# Patient Record
Sex: MALE | Race: WHITE | Employment: UNEMPLOYED | URBAN - METROPOLITAN AREA
[De-identification: names, ages, dates, MRNs, and addresses within clinical notes are randomized per-mention and may not be internally consistent; named-entity substitution may affect disease eponyms.]

---

## 2020-06-18 ENCOUNTER — HOSPITAL ENCOUNTER (EMERGENCY)
Age: 50
Discharge: HOME | End: 2020-06-19
Attending: EMERGENCY MEDICINE
Payer: COMMERCIAL

## 2020-06-18 ENCOUNTER — APPOINTMENT (OUTPATIENT)
Dept: GENERAL RADIOLOGY | Age: 50
End: 2020-06-18
Payer: COMMERCIAL

## 2020-06-18 PROCEDURE — G0480 DRUG TEST DEF 1-7 CLASSES: HCPCS

## 2020-06-18 PROCEDURE — 93005 ELECTROCARDIOGRAM TRACING: CPT | Performed by: EMERGENCY MEDICINE

## 2020-06-18 PROCEDURE — 85025 COMPLETE CBC W/AUTO DIFF WBC: CPT

## 2020-06-18 PROCEDURE — 83605 ASSAY OF LACTIC ACID: CPT

## 2020-06-18 PROCEDURE — 71045 X-RAY EXAM CHEST 1 VIEW: CPT

## 2020-06-18 PROCEDURE — 84484 ASSAY OF TROPONIN QUANT: CPT

## 2020-06-18 PROCEDURE — 82140 ASSAY OF AMMONIA: CPT

## 2020-06-18 PROCEDURE — 99285 EMERGENCY DEPT VISIT HI MDM: CPT

## 2020-06-18 PROCEDURE — 80307 DRUG TEST PRSMV CHEM ANLYZR: CPT

## 2020-06-18 PROCEDURE — 80048 BASIC METABOLIC PNL TOTAL CA: CPT

## 2020-06-18 PROCEDURE — 80076 HEPATIC FUNCTION PANEL: CPT

## 2020-06-18 RX ORDER — 0.9 % SODIUM CHLORIDE 0.9 %
1000 INTRAVENOUS SOLUTION INTRAVENOUS ONCE
Status: COMPLETED | OUTPATIENT
Start: 2020-06-18 | End: 2020-06-19

## 2020-06-19 ENCOUNTER — APPOINTMENT (OUTPATIENT)
Dept: GENERAL RADIOLOGY | Age: 50
End: 2020-06-19
Payer: COMMERCIAL

## 2020-06-19 ENCOUNTER — HOSPITAL ENCOUNTER (OUTPATIENT)
Age: 50
Setting detail: OBSERVATION
Discharge: HOME | End: 2020-06-20
Attending: EMERGENCY MEDICINE | Admitting: INTERNAL MEDICINE
Payer: COMMERCIAL

## 2020-06-19 ENCOUNTER — APPOINTMENT (OUTPATIENT)
Dept: CT IMAGING | Age: 50
End: 2020-06-19
Payer: COMMERCIAL

## 2020-06-19 VITALS
RESPIRATION RATE: 16 BRPM | SYSTOLIC BLOOD PRESSURE: 114 MMHG | HEART RATE: 75 BPM | TEMPERATURE: 98.1 F | DIASTOLIC BLOOD PRESSURE: 79 MMHG | OXYGEN SATURATION: 97 %

## 2020-06-19 VITALS
SYSTOLIC BLOOD PRESSURE: 137 MMHG | TEMPERATURE: 97 F | WEIGHT: 195 LBS | BODY MASS INDEX: 23.02 KG/M2 | OXYGEN SATURATION: 99 % | DIASTOLIC BLOOD PRESSURE: 86 MMHG | HEART RATE: 86 BPM | HEIGHT: 77 IN | RESPIRATION RATE: 17 BRPM

## 2020-06-19 PROBLEM — F41.9 ANXIETY: Status: ACTIVE | Noted: 2020-06-19

## 2020-06-19 PROBLEM — T50.901A DRUG OVERDOSE, ACCIDENTAL OR UNINTENTIONAL, INITIAL ENCOUNTER: Status: ACTIVE | Noted: 2020-06-19

## 2020-06-19 PROBLEM — F19.20 POLYSUBSTANCE (EXCLUDING OPIOIDS) DEPENDENCE, DAILY USE (HCC): Status: ACTIVE | Noted: 2020-06-19

## 2020-06-19 LAB
-: ABNORMAL
ABSOLUTE EOS #: 0.33 K/UL (ref 0–0.44)
ABSOLUTE EOS #: 0.5 K/UL (ref 0–0.44)
ABSOLUTE IMMATURE GRANULOCYTE: 0.04 K/UL (ref 0–0.3)
ABSOLUTE IMMATURE GRANULOCYTE: <0.03 K/UL (ref 0–0.3)
ABSOLUTE LYMPH #: 1.82 K/UL (ref 1.1–3.7)
ABSOLUTE LYMPH #: 1.92 K/UL (ref 1.1–3.7)
ABSOLUTE MONO #: 0.64 K/UL (ref 0.1–1.2)
ABSOLUTE MONO #: 0.65 K/UL (ref 0.1–1.2)
ACETAMINOPHEN LEVEL: <5 UG/ML (ref 10–30)
ACETAMINOPHEN LEVEL: <5 UG/ML (ref 10–30)
ALBUMIN SERPL-MCNC: 4.2 G/DL (ref 3.5–5.2)
ALBUMIN/GLOBULIN RATIO: 1.6 (ref 1–2.5)
ALP BLD-CCNC: 128 U/L (ref 40–129)
ALT SERPL-CCNC: 6 U/L (ref 5–41)
AMMONIA: 64 UMOL/L (ref 16–60)
AMORPHOUS: ABNORMAL
AMPHETAMINE SCREEN URINE: NEGATIVE
ANION GAP SERPL CALCULATED.3IONS-SCNC: 11 MMOL/L (ref 9–17)
ANION GAP SERPL CALCULATED.3IONS-SCNC: 14 MMOL/L (ref 9–17)
AST SERPL-CCNC: 13 U/L
BACTERIA: ABNORMAL
BARBITURATE SCREEN URINE: NEGATIVE
BASOPHILS # BLD: 1 % (ref 0–2)
BASOPHILS # BLD: 1 % (ref 0–2)
BASOPHILS ABSOLUTE: 0.05 K/UL (ref 0–0.2)
BASOPHILS ABSOLUTE: 0.1 K/UL (ref 0–0.2)
BENZODIAZEPINE SCREEN, URINE: POSITIVE
BILIRUB SERPL-MCNC: 0.32 MG/DL (ref 0.3–1.2)
BILIRUBIN DIRECT: 0.09 MG/DL
BILIRUBIN URINE: NEGATIVE
BILIRUBIN, INDIRECT: 0.23 MG/DL (ref 0–1)
BUN BLDV-MCNC: 9 MG/DL (ref 6–20)
BUN BLDV-MCNC: 9 MG/DL (ref 6–20)
BUN/CREAT BLD: ABNORMAL (ref 9–20)
BUN/CREAT BLD: NORMAL (ref 9–20)
BUPRENORPHINE URINE: ABNORMAL
CALCIUM SERPL-MCNC: 8.6 MG/DL (ref 8.6–10.4)
CALCIUM SERPL-MCNC: 8.8 MG/DL (ref 8.6–10.4)
CANNABINOID SCREEN URINE: NEGATIVE
CASTS UA: ABNORMAL /LPF (ref 0–8)
CHLORIDE BLD-SCNC: 105 MMOL/L (ref 98–107)
CHLORIDE BLD-SCNC: 105 MMOL/L (ref 98–107)
CHP ED QC CHECK: YES
CO2: 23 MMOL/L (ref 20–31)
CO2: 24 MMOL/L (ref 20–31)
COCAINE METABOLITE, URINE: NEGATIVE
COLOR: ABNORMAL
CREAT SERPL-MCNC: 0.74 MG/DL (ref 0.7–1.2)
CREAT SERPL-MCNC: 1.09 MG/DL (ref 0.7–1.2)
CRYSTALS, UA: ABNORMAL /HPF
CRYSTALS, UA: ABNORMAL /HPF
DIFFERENTIAL TYPE: ABNORMAL
DIFFERENTIAL TYPE: ABNORMAL
EKG ATRIAL RATE: 85 BPM
EKG P AXIS: 55 DEGREES
EKG P-R INTERVAL: 170 MS
EKG Q-T INTERVAL: 390 MS
EKG QRS DURATION: 100 MS
EKG QTC CALCULATION (BAZETT): 464 MS
EKG R AXIS: 37 DEGREES
EKG T AXIS: 31 DEGREES
EKG VENTRICULAR RATE: 85 BPM
EOSINOPHILS RELATIVE PERCENT: 5 % (ref 1–4)
EOSINOPHILS RELATIVE PERCENT: 6 % (ref 1–4)
EPITHELIAL CELLS UA: ABNORMAL /HPF (ref 0–5)
ETHANOL PERCENT: <0.01 %
ETHANOL PERCENT: <0.01 %
ETHANOL: <10 MG/DL
ETHANOL: <10 MG/DL
GFR AFRICAN AMERICAN: >60 ML/MIN
GFR AFRICAN AMERICAN: >60 ML/MIN
GFR NON-AFRICAN AMERICAN: >60 ML/MIN
GFR NON-AFRICAN AMERICAN: >60 ML/MIN
GFR SERPL CREATININE-BSD FRML MDRD: ABNORMAL ML/MIN/{1.73_M2}
GFR SERPL CREATININE-BSD FRML MDRD: ABNORMAL ML/MIN/{1.73_M2}
GFR SERPL CREATININE-BSD FRML MDRD: NORMAL ML/MIN/{1.73_M2}
GFR SERPL CREATININE-BSD FRML MDRD: NORMAL ML/MIN/{1.73_M2}
GLOBULIN: NORMAL G/DL (ref 1.5–3.8)
GLUCOSE BLD-MCNC: 107 MG/DL (ref 70–99)
GLUCOSE BLD-MCNC: 81 MG/DL
GLUCOSE BLD-MCNC: 81 MG/DL (ref 75–110)
GLUCOSE BLD-MCNC: 89 MG/DL (ref 70–99)
GLUCOSE URINE: NEGATIVE
HCT VFR BLD CALC: 38.8 % (ref 40.7–50.3)
HCT VFR BLD CALC: 43.5 % (ref 40.7–50.3)
HEMOGLOBIN: 12.6 G/DL (ref 13–17)
HEMOGLOBIN: 13.6 G/DL (ref 13–17)
IMMATURE GRANULOCYTES: 0 %
IMMATURE GRANULOCYTES: 1 %
KETONES, URINE: ABNORMAL
LACTIC ACID, WHOLE BLOOD: 1.3 MMOL/L (ref 0.7–2.1)
LEUKOCYTE ESTERASE, URINE: NEGATIVE
LYMPHOCYTES # BLD: 23 % (ref 24–43)
LYMPHOCYTES # BLD: 28 % (ref 24–43)
MCH RBC QN AUTO: 29.4 PG (ref 25.2–33.5)
MCH RBC QN AUTO: 29.9 PG (ref 25.2–33.5)
MCHC RBC AUTO-ENTMCNC: 31.3 G/DL (ref 28.4–34.8)
MCHC RBC AUTO-ENTMCNC: 32.5 G/DL (ref 28.4–34.8)
MCV RBC AUTO: 91.9 FL (ref 82.6–102.9)
MCV RBC AUTO: 94 FL (ref 82.6–102.9)
MDMA URINE: ABNORMAL
METHADONE SCREEN, URINE: NEGATIVE
METHAMPHETAMINE, URINE: ABNORMAL
MONOCYTES # BLD: 10 % (ref 3–12)
MONOCYTES # BLD: 8 % (ref 3–12)
MUCUS: ABNORMAL
NITRITE, URINE: NEGATIVE
NRBC AUTOMATED: 0 PER 100 WBC
NRBC AUTOMATED: 0 PER 100 WBC
OPIATES, URINE: NEGATIVE
OTHER OBSERVATIONS UA: ABNORMAL
OXYCODONE SCREEN URINE: NEGATIVE
PDW BLD-RTO: 13.4 % (ref 11.8–14.4)
PDW BLD-RTO: 13.5 % (ref 11.8–14.4)
PH UA: 5 (ref 5–8)
PHENCYCLIDINE, URINE: NEGATIVE
PLATELET # BLD: 241 K/UL (ref 138–453)
PLATELET # BLD: 259 K/UL (ref 138–453)
PLATELET ESTIMATE: ABNORMAL
PLATELET ESTIMATE: ABNORMAL
PMV BLD AUTO: 8.7 FL (ref 8.1–13.5)
PMV BLD AUTO: 8.9 FL (ref 8.1–13.5)
POTASSIUM SERPL-SCNC: 3.9 MMOL/L (ref 3.7–5.3)
POTASSIUM SERPL-SCNC: 4.1 MMOL/L (ref 3.7–5.3)
PROPOXYPHENE, URINE: ABNORMAL
PROTEIN UA: ABNORMAL
RBC # BLD: 4.22 M/UL (ref 4.21–5.77)
RBC # BLD: 4.63 M/UL (ref 4.21–5.77)
RBC # BLD: ABNORMAL 10*6/UL
RBC # BLD: ABNORMAL 10*6/UL
RBC UA: ABNORMAL /HPF (ref 0–4)
RENAL EPITHELIAL, UA: ABNORMAL /HPF
SALICYLATE LEVEL: <1 MG/DL (ref 3–10)
SALICYLATE LEVEL: <1 MG/DL (ref 3–10)
SEG NEUTROPHILS: 56 % (ref 36–65)
SEG NEUTROPHILS: 61 % (ref 36–65)
SEGMENTED NEUTROPHILS ABSOLUTE COUNT: 3.54 K/UL (ref 1.5–8.1)
SEGMENTED NEUTROPHILS ABSOLUTE COUNT: 5.31 K/UL (ref 1.5–8.1)
SODIUM BLD-SCNC: 140 MMOL/L (ref 135–144)
SODIUM BLD-SCNC: 142 MMOL/L (ref 135–144)
SPECIFIC GRAVITY UA: 1.02 (ref 1–1.03)
TEST INFORMATION: ABNORMAL
TOTAL PROTEIN: 6.9 G/DL (ref 6.4–8.3)
TOXIC TRICYCLIC SC,BLOOD: NEGATIVE
TOXIC TRICYCLIC SC,BLOOD: NEGATIVE
TRICHOMONAS: ABNORMAL
TRICYCLIC ANTIDEPRESSANTS, UR: ABNORMAL
TROPONIN INTERP: NORMAL
TROPONIN T: NORMAL NG/ML
TROPONIN, HIGH SENSITIVITY: 9 NG/L (ref 0–22)
TURBIDITY: ABNORMAL
URINE HGB: ABNORMAL
UROBILINOGEN, URINE: NORMAL
WBC # BLD: 6.4 K/UL (ref 3.5–11.3)
WBC # BLD: 8.5 K/UL (ref 3.5–11.3)
WBC # BLD: ABNORMAL 10*3/UL
WBC # BLD: ABNORMAL 10*3/UL
WBC UA: ABNORMAL /HPF (ref 0–5)
YEAST: ABNORMAL

## 2020-06-19 PROCEDURE — 81001 URINALYSIS AUTO W/SCOPE: CPT

## 2020-06-19 PROCEDURE — 93010 ELECTROCARDIOGRAM REPORT: CPT | Performed by: INTERNAL MEDICINE

## 2020-06-19 PROCEDURE — 99219 PR INITIAL OBSERVATION CARE/DAY 50 MINUTES: CPT | Performed by: NURSE PRACTITIONER

## 2020-06-19 PROCEDURE — 82947 ASSAY GLUCOSE BLOOD QUANT: CPT

## 2020-06-19 PROCEDURE — 2580000003 HC RX 258: Performed by: STUDENT IN AN ORGANIZED HEALTH CARE EDUCATION/TRAINING PROGRAM

## 2020-06-19 PROCEDURE — 99285 EMERGENCY DEPT VISIT HI MDM: CPT

## 2020-06-19 PROCEDURE — 80307 DRUG TEST PRSMV CHEM ANLYZR: CPT

## 2020-06-19 PROCEDURE — 85025 COMPLETE CBC W/AUTO DIFF WBC: CPT

## 2020-06-19 PROCEDURE — 70450 CT HEAD/BRAIN W/O DYE: CPT

## 2020-06-19 PROCEDURE — G0378 HOSPITAL OBSERVATION PER HR: HCPCS

## 2020-06-19 PROCEDURE — G0480 DRUG TEST DEF 1-7 CLASSES: HCPCS

## 2020-06-19 PROCEDURE — 93005 ELECTROCARDIOGRAM TRACING: CPT | Performed by: STUDENT IN AN ORGANIZED HEALTH CARE EDUCATION/TRAINING PROGRAM

## 2020-06-19 PROCEDURE — 80048 BASIC METABOLIC PNL TOTAL CA: CPT

## 2020-06-19 RX ORDER — ACETAMINOPHEN 650 MG/1
650 SUPPOSITORY RECTAL EVERY 6 HOURS PRN
Status: DISCONTINUED | OUTPATIENT
Start: 2020-06-19 | End: 2020-06-20 | Stop reason: HOSPADM

## 2020-06-19 RX ORDER — SODIUM CHLORIDE 0.9 % (FLUSH) 0.9 %
10 SYRINGE (ML) INJECTION PRN
Status: DISCONTINUED | OUTPATIENT
Start: 2020-06-19 | End: 2020-06-20 | Stop reason: HOSPADM

## 2020-06-19 RX ORDER — NICOTINE 21 MG/24HR
1 PATCH, TRANSDERMAL 24 HOURS TRANSDERMAL DAILY PRN
Status: DISCONTINUED | OUTPATIENT
Start: 2020-06-19 | End: 2020-06-20 | Stop reason: HOSPADM

## 2020-06-19 RX ORDER — 0.9 % SODIUM CHLORIDE 0.9 %
1000 INTRAVENOUS SOLUTION INTRAVENOUS ONCE
Status: COMPLETED | OUTPATIENT
Start: 2020-06-19 | End: 2020-06-19

## 2020-06-19 RX ORDER — ACETAMINOPHEN 325 MG/1
650 TABLET ORAL EVERY 6 HOURS PRN
Status: DISCONTINUED | OUTPATIENT
Start: 2020-06-19 | End: 2020-06-20 | Stop reason: HOSPADM

## 2020-06-19 RX ORDER — PROMETHAZINE HYDROCHLORIDE 25 MG/1
12.5 TABLET ORAL EVERY 6 HOURS PRN
Status: DISCONTINUED | OUTPATIENT
Start: 2020-06-19 | End: 2020-06-20 | Stop reason: HOSPADM

## 2020-06-19 RX ORDER — ONDANSETRON 2 MG/ML
4 INJECTION INTRAMUSCULAR; INTRAVENOUS EVERY 6 HOURS PRN
Status: DISCONTINUED | OUTPATIENT
Start: 2020-06-19 | End: 2020-06-20 | Stop reason: HOSPADM

## 2020-06-19 RX ORDER — SODIUM CHLORIDE 0.9 % (FLUSH) 0.9 %
10 SYRINGE (ML) INJECTION EVERY 12 HOURS SCHEDULED
Status: DISCONTINUED | OUTPATIENT
Start: 2020-06-19 | End: 2020-06-20 | Stop reason: HOSPADM

## 2020-06-19 RX ADMIN — SODIUM CHLORIDE 1000 ML: 9 INJECTION, SOLUTION INTRAVENOUS at 02:53

## 2020-06-19 RX ADMIN — SODIUM CHLORIDE 1000 ML: 9 INJECTION, SOLUTION INTRAVENOUS at 10:14

## 2020-06-19 RX ADMIN — SODIUM CHLORIDE 1000 ML: 9 INJECTION, SOLUTION INTRAVENOUS at 00:02

## 2020-06-19 ASSESSMENT — ENCOUNTER SYMPTOMS
BACK PAIN: 0
SINUS PAIN: 0
CHEST TIGHTNESS: 0
SHORTNESS OF BREATH: 0
SORE THROAT: 0
ABDOMINAL PAIN: 0
NAUSEA: 0
ABDOMINAL DISTENTION: 0
CONSTIPATION: 0
STRIDOR: 0
DIARRHEA: 0
VOMITING: 0

## 2020-06-19 NOTE — ED PROVIDER NOTES
Active member of club or organization: Not on file     Attends meetings of clubs or organizations: Not on file     Relationship status: Not on file    Intimate partner violence     Fear of current or ex partner: Not on file     Emotionally abused: Not on file     Physically abused: Not on file     Forced sexual activity: Not on file   Other Topics Concern    Not on file   Social History Narrative    Not on file     Family History: No family history on file. Allergies: Allergies not on file    Home Medications:  Prior to Admission medications    Not on File     REVIEW OF SYSTEMS    (2-9 systems for level 4, 10 or more for level 5)      ROS: Unable to perform due to disorientation    PHYSICAL EXAM   (up to 7 for level 4, 8 or more for level 5)      /88   Pulse 89   Temp 98.1 °F (36.7 °C) (Oral)   Resp 16   SpO2 99%     Constitutional: Well developed; well-nourished severe distress  HENT: Normocephalic, atraumatic   Eyes: WILFREDO: 5mm,  Conj nl  Neck: trachea midline, no jvd  Cardiovascular:  No significant murmurs distal heart tones  Pulmonary/Chest Wall: diminished, clear to auscultate bilaterally without wheezes, rhonchi, rales  Abdomen: Soft,  non-distended  no pulsatile mass  Musculoskeletal: no visible injuries or edema  Neurological: Moving all extremities, focal deficits, and slightly disoriented.   Skin: cool and pale    DIFFERENTIAL  DIAGNOSIS     PLAN (LABS / IMAGING / EKG):  Orders Placed This Encounter   Procedures    CT HEAD WO CONTRAST    XR CHEST PORTABLE    CBC Auto Differential    Ammonia    TOX SCR, BLD, ED    Urine Drug Screen    Urinalysis with Microscopic    Troponin    Basic Metabolic Panel    Hepatic Function Panel    Lactic Acid, Whole Blood    EKG 12 Lead    Restraints violent or self-destructive adult (older than 16yo)     MEDICATIONS ORDERED:  Orders Placed This Encounter   Medications    0.9 % sodium chloride bolus     DDX: ingestion, infectious, trauma, seizure, Ur NEGATIVE NEGATIVE    Oxycodone Screen, Ur NEGATIVE NEGATIVE    Methamphetamine, Urine NOT REPORTED NEGATIVE    Tricyclic Antidepressants, Urine NOT REPORTED NEGATIVE    MDMA, Urine NOT REPORTED NEGATIVE    Buprenorphine Urine NOT REPORTED NEGATIVE    Test Information       Assay provides medical screening only. The absence of expected drug(s) and/or metabolite(s) may indicate diluted or adulterated urine, limitations of testing or timing of collection. Troponin   Result Value Ref Range    Troponin, High Sensitivity 9 0 - 22 ng/L    Troponin T NOT REPORTED <0.03 ng/mL    Troponin Interp NOT REPORTED    Basic Metabolic Panel   Result Value Ref Range    Glucose 107 (H) 70 - 99 mg/dL    BUN 9 6 - 20 mg/dL    CREATININE 1.09 0.70 - 1.20 mg/dL    Bun/Cre Ratio NOT REPORTED 9 - 20    Calcium 8.8 8.6 - 10.4 mg/dL    Sodium 142 135 - 144 mmol/L    Potassium 3.9 3.7 - 5.3 mmol/L    Chloride 105 98 - 107 mmol/L    CO2 23 20 - 31 mmol/L    Anion Gap 14 9 - 17 mmol/L    GFR Non-African American >60 >60 mL/min    GFR African American >60 >60 mL/min    GFR Comment          GFR Staging NOT REPORTED    Hepatic Function Panel   Result Value Ref Range    Alb 4.2 3.5 - 5.2 g/dL    Alkaline Phosphatase 128 40 - 129 U/L    ALT 6 5 - 41 U/L    AST 13 <40 U/L    Total Bilirubin 0.32 0.3 - 1.2 mg/dL    Bilirubin, Direct 0.09 <0.31 mg/dL    Bilirubin, Indirect 0.23 0.00 - 1.00 mg/dL    Total Protein 6.9 6.4 - 8.3 g/dL    Globulin NOT REPORTED 1.5 - 3.8 g/dL    Albumin/Globulin Ratio 1.6 1.0 - 2.5   Lactic Acid, Whole Blood   Result Value Ref Range    Lactic Acid, Whole Blood 1.3 0.7 - 2.1 mmol/L     RADIOLOGY:  CT HEAD WO CONTRAST   Final Result   No acute intracranial abnormality. Mild right maxillary and ethmoid sinus disease with an air-fluid level in the   right maxillary sinus, correlate for symptoms of acute sinusitis.          XR CHEST PORTABLE   Final Result   Mild bilateral patchy pulmonary opacities without lobar

## 2020-06-19 NOTE — CARE COORDINATION
Case Management Initial Discharge Plan  Dread Barker,             Met with:patients mother to discuss discharge plans. Information verified: address, contacts, phone number, , insurance Yes    Emergency Contact/Next of Kin name & number: Harshal Parish [phone]    PCP: Dontrell Henderson MD  Date of last visit: 1 1/2 yrs    Insurance Provider:  Jw    Discharge Planning    Living Arrangements:  Alone   Support Systems:  Family Members, Pee Jin has 1 stories  10 stairs to climb to get into front door, 0stairs to climb to reach second floor  Location of bedroom/bathroom in home main    Patient able to perform ADL's:Independent    Current Services (outpatient & in home) none  DME equipment: none  DME provider: none    Receiving oral anticoagulation therapy? No    If indicated:   Physician managing anticoagulation treatment: none  Where does patient obtain lab work for ATC treatment? none      Potential Assistance Needed:       Patient agreeable to home care: No  [place] of choice provided:  n/a    Prior SNF/Rehab Placement and Facility: none  Agreeable to SNF/Rehab: No  [place] of choice provided: n/a     Evaluation: no    Expected Discharge date:       Patient expects to be discharged to:  home  Follow Up Appointment: Best Day/ Time:      Transportation provider: family/ self  Transportation arrangements needed for discharge: No    Readmission Risk              Risk of Unplanned Readmission:        0             Does patient have a readmission risk score greater than 14?: not calculated  If yes, follow-up appointment must be made within 7 days of discharge. Goals of Care: Pt cannot answer questions at this time, pts mother goal is rehab      Discharge Plan: Unknown at this time if pt is willing to go to rehab, pcp is established but has not been seen 1 1/2 yrs.  Pt has transportation          Electronically signed by Timothy Ferrari RN on 20 at 12:53 PM EDT

## 2020-06-19 NOTE — ED NOTES
Pt presented to ED following taking 4 xanax bars. Pt presents with AMA. Pt ripped out EMS IV and tried to eat it. Pt denies any pain. Pt denies any falls.       Madhu Garcia RN  06/19/20 1007

## 2020-06-19 NOTE — ED PROVIDER NOTES
[address] [facility]  Emergency Department  Emergency Medicine Resident Sign-out     Care of Sonja Thornton was assumed from Dr. Sona Romo and is being seen for Altered Mental Status (pt found outside the hospital, just d/c this morning for OD. Per hospital staff pt was having bizzare behavior)  . The patient's initial evaluation and plan have been discussed with the prior provider who initially evaluated the patient. EMERGENCY DEPARTMENT COURSE / MEDICAL DECISION MAKING:       MEDICATIONS GIVEN:  Orders Placed This Encounter   Medications    0.9 % sodium chloride bolus    DISCONTD: sodium chloride flush 0.9 % injection 10 mL    DISCONTD: sodium chloride flush 0.9 % injection 10 mL    DISCONTD: acetaminophen (TYLENOL) tablet 650 mg    DISCONTD: acetaminophen (TYLENOL) suppository 650 mg    DISCONTD: magnesium hydroxide (MILK OF MAGNESIA) 400 MG/5ML suspension 30 mL    DISCONTD: promethazine (PHENERGAN) tablet 12.5 mg    DISCONTD: ondansetron (ZOFRAN) injection 4 mg    DISCONTD: nicotine (NICODERM CQ) 21 MG/24HR 1 patch    DISCONTD: enoxaparin (LOVENOX) injection 40 mg     Hold if concern for overdose on any type of anticoagulant medication.        LABS / RADIOLOGY:     Labs Reviewed   CBC WITH AUTO DIFFERENTIAL - Abnormal; Notable for the following components:       Result Value    Hemoglobin 12.6 (*)     Hematocrit 38.8 (*)     Eosinophils % 5 (*)     All other components within normal limits   TOX SCR, BLD, ED - Abnormal; Notable for the following components:    Acetaminophen Level <5 (*)     Salicylate Lvl <1 (*)     All other components within normal limits   URINALYSIS - Abnormal; Notable for the following components:    Urine Hgb SMALL (*)     All other components within normal limits   URINE DRUG SCREEN - Abnormal; Notable for the following components:    Benzodiazepine Screen, Urine POSITIVE (*)     All other components within normal limits   POCT GLUCOSE - Normal   BASIC METABOLIC based adjustment of the mA/kV was utilized to reduce the radiation dose to as low as reasonably achievable. COMPARISON: None. HISTORY: ORDERING SYSTEM PROVIDED HISTORY: AMS TECHNOLOGIST PROVIDED HISTORY: AMS Reason for Exam: Altered Mental Status Acuity: Unknown Type of Exam: Unknown FINDINGS: BRAIN/VENTRICLES: There is no acute intracranial hemorrhage, mass effect or midline shift. No abnormal extra-axial fluid collection. The gray-white differentiation is maintained without evidence of an acute infarct. There is no evidence of hydrocephalus. ORBITS: The visualized portion of the orbits demonstrate no acute abnormality. SINUSES: Postsurgical changes in the paranasal sinuses. Mild right maxillary and ethmoid sinus mucosal thickening. Small air-fluid level in the right maxillary sinus. SOFT TISSUES/SKULL:  No acute abnormality of the visualized skull or soft tissues. No acute intracranial abnormality. Mild right maxillary and ethmoid sinus disease with an air-fluid level in the right maxillary sinus, correlate for symptoms of acute sinusitis. RECENT VITALS:     Temp: 97 °F (36.1 °C),  Pulse: 86, Resp: 17, BP: 137/86, SpO2: 99 %    This patient is a 52 y.o. Male second visit today, discharged earlier today for benzo overdose. Patient had bizarre behavior, AMS, alert and oriented to person only. ED tox negative  Patient was agitated, required Haldol  ED Course as of Jun 21 2004 Fri Jun 19, 2020 2154 Spoke with Sky as patient wants to leave and wondering why he is being admitted. Plan is to be evaluated by psychiatry and if they clear him Inter[facility] would be okay for him to go. Until then patient will remain admitted. [SF]   Sat Jun 20, 2020   0401 Discussed with social work as well as patient and he denies suicidal ideation. Patient stating he needs to go to work today otherwise he will lose his job. He is alert and oriented, has a capacity to make decisions.   I discussed this case with

## 2020-06-19 NOTE — PROGRESS NOTES
correlate for symptoms of acute sinusitis. XR CHEST PORTABLE   Final Result   Mild bilateral patchy pulmonary opacities without lobar consolidation may   relate to subsegmental atelectasis. Developing infectious/inflammatory   process is not excluded. Follow-up PA and lateral chest radiographs may be   helpful for further evaluation as warranted. RECENT VITALS:     Temp: 98.1 °F (36.7 °C),  Pulse: 77, Resp: 16, BP: 104/74, SpO2: 98 %    This patient is a 52 y.o. Male  who presents After reportedly being found passed out in the alley next to his home by neighbors. EMS on scene noted that the patient was somnolent and slow to respond with some disorientation. Patient is moving all extremities and states that he took too much Xanax tonight; he reports he took 4 of his Xanax when he normally takes 1. He does not know what the dose for each pill is. Blood glucose was 105. OUTSTANDING TASKS / RECOMMENDATIONS:      1. Await re-evaluation once Xanax wears off   2. Patient ambulate around the emergency department, easily arousable, answering questions appropriately will plan for discharge. FINAL IMPRESSION:     1. Altered mental status, unspecified altered mental status type    2. Benzodiazepine overdose, accidental or unintentional, initial encounter      DISPOSITION:       DISPOSITION:  [x]  Discharge   []  Transfer -    []  Admission -     []  Against Medical Advice   []  Eloped   FOLLOW-UP: No follow-up provider specified.    DISCHARGE MEDICATIONS: New Prescriptions    No medications on file          Cayden Stapleton MD  Emergency Medicine Resident  [facility]

## 2020-06-19 NOTE — ED NOTES
Dr. Gabe Herrera spoke with pt. Pt moved to [facility]. Report given to Lady Jud RN.      Jackie Roman RN  06/19/20 6847

## 2020-06-19 NOTE — ED PROVIDER NOTES
Immature Granulocytes 0 0 %    Segs Absolute 3.54 1.50 - 8.10 k/uL    Absolute Lymph # 1.82 1.10 - 3.70 k/uL    Absolute Mono # 0.64 0.10 - 1.20 k/uL    Absolute Eos # 0.33 0.00 - 0.44 k/uL    Basophils Absolute 0.05 0.00 - 0.20 k/uL    Absolute Immature Granulocyte <0.03 0.00 - 0.30 k/uL    WBC Morphology NOT REPORTED     RBC Morphology NOT REPORTED     Platelet Estimate NOT REPORTED    Basic Metabolic Panel w/ Reflex to MG   Result Value Ref Range    Glucose 89 70 - 99 mg/dL    BUN 9 6 - 20 mg/dL    CREATININE 0.74 0.70 - 1.20 mg/dL    Bun/Cre Ratio NOT REPORTED 9 - 20    Calcium 8.6 8.6 - 10.4 mg/dL    Sodium 140 135 - 144 mmol/L    Potassium 4.1 3.7 - 5.3 mmol/L    Chloride 105 98 - 107 mmol/L    CO2 24 20 - 31 mmol/L    Anion Gap 11 9 - 17 mmol/L    GFR Non-African American >60 >60 mL/min    GFR African American >60 >60 mL/min    GFR Comment          GFR Staging NOT REPORTED    TOX SCR, BLD, ED   Result Value Ref Range    Acetaminophen Level <5 (L) 10 - 30 ug/mL    Ethanol <10 <10 mg/dL    Ethanol percent <5.442 <9.488 %    Salicylate Lvl <1 (L) 3 - 10 mg/dL    Toxic Tricyclic Sc,Blood NEGATIVE NEGATIVE   POCT Glucose   Result Value Ref Range    Glucose 81 mg/dL    QC OK? yes    POC Glucose Fingerstick   Result Value Ref Range    POC Glucose 81 75 - 110 mg/dL       IMPRESSION: 29-year-old male in no acute distress but confused and altered, oriented to self only. Patient seen in emergency department earlier this morning for similar issues. Found to likely have benzodiazepine overdose. Patient reported at that time that he had taken several Xanax. Patient was discharged and then 3 hours later found acting bizarre and walking around the parking lot of the hospital.  Patient confused, stating that he does not believe he ever left and denying ever being out in the parking lot. Patient believes he is at a different hospital and that the year is currently 2000.   Patient denies taking any other drugs or substances after initial discharge from emergency department this morning. Likely benzodiazepine abuse. Will get labs to rule out metabolic encephalopathy, will get repeat head CT rule out new trauma or intracranial abnormality as patient is unaware of what has happened for the past 3 hours. RADIOLOGY:  Ct Head Wo Contrast    Result Date: 6/19/2020  EXAMINATION: CT OF THE HEAD WITHOUT CONTRAST  6/19/2020 11:07 am TECHNIQUE: CT of the head was performed without the administration of intravenous contrast. Dose modulation, iterative reconstruction, and/or weight based adjustment of the mA/kV was utilized to reduce the radiation dose to as low as reasonably achievable. COMPARISON: CT head performed earlier on the same date, 06/19/2020 HISTORY: ORDERING SYSTEM PROVIDED HISTORY: AMS, disoriented to place and time, possible overdose TECHNOLOGIST PROVIDED HISTORY: AMS, disoriented to place and time, possible overdose Reason for Exam: AMS, disoriented to place and time, possible overdose FINDINGS: BRAIN/VENTRICLES: There is no acute intracranial hemorrhage, mass effect or midline shift. No abnormal extra-axial fluid collection. The gray-white differentiation is maintained without evidence of an acute infarct. There is no evidence of hydrocephalus. ORBITS: The visualized portion of the orbits demonstrate no acute abnormality. SINUSES: Mild mucosal thickening and small air-fluid level are again noted in the right maxillary sinus. There is also mucosal thickening in the right ethmoid sinuses. SOFT TISSUES/SKULL:  No acute abnormality of the visualized skull or soft tissues. 1.  No acute intracranial process. 2.  Redemonstration of mild mucosal thickening and small air-fluid level in the right maxillary and ethmoid sinuses.      EKG  EKG Interpretation    Interpreted by me    Rhythm: normal sinus   Rate: 91 bpm  Axis: normal  Ectopy: none  Conduction: normal  ST Segments: no acute change  T Waves: no acute change  Q Waves:

## 2020-06-19 NOTE — H&P
needed. Social work, psychiatry consult  2. Anxiety: Patient was taking alprazolam with last documented refill in October 2018. 3. GI/DVT prophylaxis: Lovenox, Pepcid  4.  Stable for discharge if cleared by psychiatry      Consultations:   Courtney Plata TO HOSPITALIST  IP CONSULT TO PSYCHIATRY        FREIDA Waller NP  6/19/2020  2:37 PM    Copy sent to Dr. Atif Espinoza MD

## 2020-06-19 NOTE — ED NOTES
Pt resting on cot. No signs of distress noted. Respirations unlabored. Sitter remains at bedside.  Will continue to monitor     Fozia Thompson RN  06/19/20 4766

## 2020-06-20 LAB
-: NORMAL
AMORPHOUS: NORMAL
AMPHETAMINE SCREEN URINE: NEGATIVE
BACTERIA: NORMAL
BARBITURATE SCREEN URINE: NEGATIVE
BENZODIAZEPINE SCREEN, URINE: POSITIVE
BILIRUBIN URINE: NEGATIVE
BUPRENORPHINE URINE: ABNORMAL
CANNABINOID SCREEN URINE: NEGATIVE
CASTS UA: NORMAL /LPF (ref 0–8)
COCAINE METABOLITE, URINE: NEGATIVE
COLOR: YELLOW
COMMENT UA: ABNORMAL
CRYSTALS, UA: NORMAL /HPF
EKG ATRIAL RATE: 91 BPM
EKG P AXIS: 50 DEGREES
EKG P-R INTERVAL: 156 MS
EKG Q-T INTERVAL: 378 MS
EKG QRS DURATION: 92 MS
EKG QTC CALCULATION (BAZETT): 464 MS
EKG R AXIS: 49 DEGREES
EKG T AXIS: 50 DEGREES
EKG VENTRICULAR RATE: 91 BPM
EPITHELIAL CELLS UA: NORMAL /HPF (ref 0–5)
GLUCOSE URINE: NEGATIVE
KETONES, URINE: NEGATIVE
LEUKOCYTE ESTERASE, URINE: NEGATIVE
MDMA URINE: ABNORMAL
METHADONE SCREEN, URINE: NEGATIVE
METHAMPHETAMINE, URINE: ABNORMAL
MUCUS: NORMAL
NITRITE, URINE: NEGATIVE
OPIATES, URINE: NEGATIVE
OTHER OBSERVATIONS UA: NORMAL
OXYCODONE SCREEN URINE: NEGATIVE
PH UA: 7.5 (ref 5–8)
PHENCYCLIDINE, URINE: NEGATIVE
PROPOXYPHENE, URINE: ABNORMAL
PROTEIN UA: NEGATIVE
RBC UA: NORMAL /HPF (ref 0–4)
RENAL EPITHELIAL, UA: NORMAL /HPF
SPECIFIC GRAVITY UA: 1.01 (ref 1–1.03)
TEST INFORMATION: ABNORMAL
TRICHOMONAS: NORMAL
TRICYCLIC ANTIDEPRESSANTS, UR: ABNORMAL
TURBIDITY: CLEAR
URINE HGB: ABNORMAL
UROBILINOGEN, URINE: NORMAL
WBC UA: NORMAL /HPF (ref 0–5)
YEAST: NORMAL

## 2020-06-20 PROCEDURE — G0378 HOSPITAL OBSERVATION PER HR: HCPCS

## 2020-06-20 PROCEDURE — 81001 URINALYSIS AUTO W/SCOPE: CPT

## 2020-06-20 PROCEDURE — 80307 DRUG TEST PRSMV CHEM ANLYZR: CPT

## 2020-06-20 PROCEDURE — 93010 ELECTROCARDIOGRAM REPORT: CPT | Performed by: INTERNAL MEDICINE

## 2020-06-20 NOTE — ED NOTES
Patient inquired why he is at [facility]. Riri.  Informed of medical admission per interdisciplinary team. Notified ED MD who will contact admitting physician to review with patient    [address], [place], W  06/19/20 0197

## 2020-06-20 NOTE — ED NOTES
No change in pt status, pt resting on cot, no urine specimen collected at this time. Guard at bedside.       Marisela Renteria RN  06/19/20 8225

## 2020-06-20 NOTE — ED NOTES
Pt remains in [facility] with NAD noted. Guard at bedside. Denies needs. Will cont to monitor, awaiting bed assignment.       Oseas Lerma RN  06/20/20 1287

## 2020-06-20 NOTE — ED NOTES
Pt reminded about needing urine sample.  Dr. Allyson Correia reminded about updating patient     Juan Carlos Le RN  06/19/20 1010

## 2020-06-20 NOTE — ED NOTES
Social work at bedside. Pt requesting to go home, states he doesn't know why he's being held here. Dr Aquilino Ruvalcaba informed.       Pedro Cruz RN  06/19/20 2026

## 2020-06-20 NOTE — ED PROVIDER NOTES
MICROSCOPIC URINALYSIS   ACETAMINOPHEN LEVEL   COMPREHENSIVE METABOLIC PANEL W/ REFLEX TO MG FOR LOW K   CBC   MAGNESIUM   POC GLUCOSE FINGERSTICK       CT HEAD WO CONTRAST   Final Result   1. No acute intracranial process. 2.  Redemonstration of mild mucosal thickening and small air-fluid level in   the right maxillary and ethmoid sinuses. RECENT VITALS:     Temp: 97 °F (36.1 °C),  Pulse: 86, Resp: 17, BP: 137/86, SpO2: 99 %    This patient is a 52 y.o. Male with AMS, found acting bizarre in the parking lot. Discharged earlier today after benzo overdose. Initially suspected suicidal ideation per mother's report, however patient and patient adamantly denying that. Work-up negative. Patient again denied suicidal ideation, alert and oriented x4, has the medical decision-making capacity and wishes to leave because he has a job that he will lose if he does not go today. Patient admitted to [facility] already with plans to be seen by psychiatry and if they are okay for him to go and [facility] with discharge. Initial resident spoke with [facility], awaiting callback from attending for potential discharge. ED Course as of Jun 20 0428   Fri Jun 19, 2020 2154 Spoke with [facility] as patient wants to leave and wondering why he is being admitted. Plan is to be evaluated by psychiatry and if they clear him [facility] would be okay for him to go. Until then patient will remain admitted. [SF]   Sat Jun 20, 2020   0401 Discussed with social work as well as patient and he denies suicidal ideation. Patient stating he needs to go to work today otherwise he will lose his job. He is alert and oriented, has a capacity to make decisions. I discussed this case with [facility] nurse practitioners who stated attending needs to be called since patient is admitted to discuss whether patient can be discharged tonight prior to psychiatry evaluation as original plan. Awaiting callback from [facility] attending. Signed out to night resident    [SF]   1171 Discussed with Tona from [facility], per Dr. Emily Gomez patient is okay to be discharged from emergency department. Reevaluated patient who has decision-making capacity, patient agrees for follow-up as outpatient.    [WG]   9892 Discussed with patient, patient is alert and oriented x4, denies any suicidal ideation, states he needs to go to work he is going to call to get a ride.    [WG]      ED Course User Index  [SF] Remberto Calvillo DO  [WG] Laura Yan DO     OUTSTANDING TASKS / RECOMMENDATIONS:      1. Await Intermed recommendations, if Intermed okay with discharge we will discharge and follow-up. FINAL IMPRESSION:     1. Altered mental status, unspecified altered mental status type        DISPOSITION:       DISPOSITION:  [x]  Discharge   []  Transfer -    []  Admission    []  Against Medical Advice   []  Eloped   FOLLOW-UP: No follow-up provider specified.    DISCHARGE MEDICATIONS: New Prescriptions    No medications on file          Laura Yan DO  Emergency Medicine Resident  [address] [place]  Resident  06/20/20 6473

## 2021-05-11 NOTE — ED PROVIDER NOTES
[address] [facility]     Emergency Department     Faculty Attestation    I performed a history and physical examination of the patient and discussed management with the resident. I have reviewed and agree with the residents findings including all diagnostic interpretations, and treatment plans as written. Any areas of disagreement are noted on the chart. I was personally present for the key portions of any procedures. I have documented in the chart those procedures where I was not present during the key portions. I have reviewed the emergency nurses triage note. I agree with the chief complaint, past medical history, past surgical history, allergies, medications, social and family history as documented unless otherwise noted below. Documentation of the HPI, Physical Exam and Medical Decision Making performed by scribjose is based on my personal performance of the HPI, PE and MDM. For Physician Assistant/ Nurse Practitioner cases/documentation I have personally evaluated this patient and have completed at least one if not all key elements of the E/M (history, physical exam, and MDM). Additional findings are as noted. 53 yo M mental status change, pt took approximately 4 xanax, unknown dose, no injury, no fever, no vomit,   pe vss somnolent, paddy, no cervical tenderness, crepitus or deformity, chest non tender, abdomen non tender, no distension, no rigidity, extremities atraumatic, nv intact x 4,     Talkative ambulatory with crutches, tolerating liquids, no suicidal or homicidal ideation, ct head-,   vss     EKG Interpretation    Interpreted by me  Normal sinus, heart rate 85, normal axis, no ischemia, QT corrected 464    CRITICAL CARE: There was a high probability of clinically significant/life threatening deterioration in this patient's condition which required my urgent intervention. Total critical care time was 5 minutes.   This excludes any time for separately reportable procedures.        [place], DO  06/18/20 3212 [address] [place] [address], DO  06/19/20 [address], DO  06/19/20 [facility], DO  06/19/20 6246 Additional Notes: Patient consent was obtained to proceed with the visit and recommended plan of care after discussion of all risks and benefits, including the risks of COVID-19 exposure. Detail Level: Simple

## 2021-06-30 ENCOUNTER — APPOINTMENT (OUTPATIENT)
Dept: CT IMAGING | Age: 51
End: 2021-06-30
Payer: COMMERCIAL

## 2021-06-30 ENCOUNTER — APPOINTMENT (OUTPATIENT)
Dept: GENERAL RADIOLOGY | Age: 51
End: 2021-06-30
Payer: COMMERCIAL

## 2021-06-30 ENCOUNTER — HOSPITAL ENCOUNTER (EMERGENCY)
Age: 51
Discharge: HOME | End: 2021-07-01
Attending: EMERGENCY MEDICINE
Payer: COMMERCIAL

## 2021-06-30 DIAGNOSIS — T40.601A NARCOTIC OVERDOSE, ACCIDENTAL OR UNINTENTIONAL, INITIAL ENCOUNTER (HCC): Primary | ICD-10-CM

## 2021-06-30 LAB
ABSOLUTE EOS #: 0.19 K/UL (ref 0–0.44)
ABSOLUTE IMMATURE GRANULOCYTE: 0.02 K/UL (ref 0–0.3)
ABSOLUTE LYMPH #: 1.9 K/UL (ref 1.1–3.7)
ABSOLUTE MONO #: 0.42 K/UL (ref 0.1–1.2)
ACETAMINOPHEN LEVEL: <10 UG/ML (ref 10–30)
AMPHETAMINE SCREEN URINE: NEGATIVE
ANION GAP SERPL CALCULATED.3IONS-SCNC: 11 MMOL/L (ref 9–17)
BARBITURATE SCREEN URINE: NEGATIVE
BASOPHILS # BLD: 2 % (ref 0–2)
BASOPHILS ABSOLUTE: 0.11 K/UL (ref 0–0.2)
BENZODIAZEPINE SCREEN, URINE: NEGATIVE
BUN BLDV-MCNC: 8 MG/DL (ref 6–20)
BUN/CREAT BLD: 9 (ref 9–20)
BUPRENORPHINE URINE: ABNORMAL
CALCIUM SERPL-MCNC: 8.7 MG/DL (ref 8.6–10.4)
CANNABINOID SCREEN URINE: NEGATIVE
CHLORIDE BLD-SCNC: 100 MMOL/L (ref 98–107)
CO2: 25 MMOL/L (ref 20–31)
COCAINE METABOLITE, URINE: NEGATIVE
CREAT SERPL-MCNC: 0.85 MG/DL (ref 0.7–1.2)
DIFFERENTIAL TYPE: NORMAL
EOSINOPHILS RELATIVE PERCENT: 3 % (ref 1–4)
ETHANOL PERCENT: <0.01 %
ETHANOL: <10 MG/DL
GFR AFRICAN AMERICAN: >60 ML/MIN
GFR NON-AFRICAN AMERICAN: >60 ML/MIN
GFR SERPL CREATININE-BSD FRML MDRD: ABNORMAL ML/MIN/{1.73_M2}
GFR SERPL CREATININE-BSD FRML MDRD: ABNORMAL ML/MIN/{1.73_M2}
GLUCOSE BLD-MCNC: 106 MG/DL
GLUCOSE BLD-MCNC: 106 MG/DL (ref 75–110)
GLUCOSE BLD-MCNC: 114 MG/DL (ref 70–99)
HCT VFR BLD CALC: 42.7 % (ref 40.7–50.3)
HEMOGLOBIN: 14 G/DL (ref 13–17)
IMMATURE GRANULOCYTES: 0 %
LYMPHOCYTES # BLD: 32 % (ref 24–43)
MCH RBC QN AUTO: 29.3 PG (ref 25.2–33.5)
MCHC RBC AUTO-ENTMCNC: 32.8 G/DL (ref 28.4–34.8)
MCV RBC AUTO: 89.3 FL (ref 82.6–102.9)
MDMA URINE: ABNORMAL
METHADONE SCREEN, URINE: NEGATIVE
METHAMPHETAMINE, URINE: ABNORMAL
MONOCYTES # BLD: 7 % (ref 3–12)
NRBC AUTOMATED: 0 PER 100 WBC
OPIATES, URINE: POSITIVE
OXYCODONE SCREEN URINE: NEGATIVE
PDW BLD-RTO: 12.4 % (ref 11.8–14.4)
PHENCYCLIDINE, URINE: NEGATIVE
PLATELET # BLD: 184 K/UL (ref 138–453)
PLATELET ESTIMATE: NORMAL
PMV BLD AUTO: 9 FL (ref 8.1–13.5)
POTASSIUM SERPL-SCNC: 4.4 MMOL/L (ref 3.7–5.3)
PROPOXYPHENE, URINE: ABNORMAL
RBC # BLD: 4.78 M/UL (ref 4.21–5.77)
RBC # BLD: NORMAL 10*6/UL
SALICYLATE LEVEL: <1 MG/DL (ref 3–10)
SEG NEUTROPHILS: 56 % (ref 36–65)
SEGMENTED NEUTROPHILS ABSOLUTE COUNT: 3.33 K/UL (ref 1.5–8.1)
SODIUM BLD-SCNC: 136 MMOL/L (ref 135–144)
TEST INFORMATION: ABNORMAL
TRICYCLIC ANTIDEPRESSANTS, UR: ABNORMAL
TROPONIN INTERP: NORMAL
TROPONIN T: NORMAL NG/ML
TROPONIN, HIGH SENSITIVITY: 9 NG/L (ref 0–22)
WBC # BLD: 6 K/UL (ref 3.5–11.3)
WBC # BLD: NORMAL 10*3/UL

## 2021-06-30 PROCEDURE — 96374 THER/PROPH/DIAG INJ IV PUSH: CPT

## 2021-06-30 PROCEDURE — 80143 DRUG ASSAY ACETAMINOPHEN: CPT

## 2021-06-30 PROCEDURE — 70450 CT HEAD/BRAIN W/O DYE: CPT

## 2021-06-30 PROCEDURE — 6360000002 HC RX W HCPCS: Performed by: EMERGENCY MEDICINE

## 2021-06-30 PROCEDURE — G0480 DRUG TEST DEF 1-7 CLASSES: HCPCS

## 2021-06-30 PROCEDURE — 71045 X-RAY EXAM CHEST 1 VIEW: CPT

## 2021-06-30 PROCEDURE — 80179 DRUG ASSAY SALICYLATE: CPT

## 2021-06-30 PROCEDURE — 80307 DRUG TEST PRSMV CHEM ANLYZR: CPT

## 2021-06-30 PROCEDURE — 96375 TX/PRO/DX INJ NEW DRUG ADDON: CPT

## 2021-06-30 PROCEDURE — 84484 ASSAY OF TROPONIN QUANT: CPT

## 2021-06-30 PROCEDURE — 80048 BASIC METABOLIC PNL TOTAL CA: CPT

## 2021-06-30 PROCEDURE — 2580000003 HC RX 258: Performed by: EMERGENCY MEDICINE

## 2021-06-30 PROCEDURE — 82947 ASSAY GLUCOSE BLOOD QUANT: CPT

## 2021-06-30 PROCEDURE — 96376 TX/PRO/DX INJ SAME DRUG ADON: CPT

## 2021-06-30 PROCEDURE — 93005 ELECTROCARDIOGRAM TRACING: CPT | Performed by: EMERGENCY MEDICINE

## 2021-06-30 PROCEDURE — 85025 COMPLETE CBC W/AUTO DIFF WBC: CPT

## 2021-06-30 PROCEDURE — 99284 EMERGENCY DEPT VISIT MOD MDM: CPT

## 2021-06-30 RX ORDER — NALOXONE HYDROCHLORIDE 1 MG/ML
2 INJECTION INTRAMUSCULAR; INTRAVENOUS; SUBCUTANEOUS ONCE
Status: COMPLETED | OUTPATIENT
Start: 2021-06-30 | End: 2021-06-30

## 2021-06-30 RX ORDER — ONDANSETRON 2 MG/ML
4 INJECTION INTRAMUSCULAR; INTRAVENOUS ONCE
Status: COMPLETED | OUTPATIENT
Start: 2021-06-30 | End: 2021-06-30

## 2021-06-30 RX ORDER — 0.9 % SODIUM CHLORIDE 0.9 %
1000 INTRAVENOUS SOLUTION INTRAVENOUS ONCE
Status: COMPLETED | OUTPATIENT
Start: 2021-06-30 | End: 2021-06-30

## 2021-06-30 RX ADMIN — ONDANSETRON 4 MG: 2 INJECTION INTRAMUSCULAR; INTRAVENOUS at 19:09

## 2021-06-30 RX ADMIN — NALOXONE HYDROCHLORIDE 2 MG: 1 INJECTION PARENTERAL at 19:10

## 2021-06-30 RX ADMIN — SODIUM CHLORIDE 1000 ML: 9 INJECTION, SOLUTION INTRAVENOUS at 20:55

## 2021-06-30 RX ADMIN — NALOXONE HYDROCHLORIDE 2 MG: 1 INJECTION PARENTERAL at 19:09

## 2021-06-30 RX ADMIN — NALOXONE HYDROCHLORIDE 2 MG: 1 INJECTION PARENTERAL at 20:55

## 2021-06-30 ASSESSMENT — ENCOUNTER SYMPTOMS
SHORTNESS OF BREATH: 0
CHEST TIGHTNESS: 0
EYE DISCHARGE: 0
EYE PAIN: 0
FACIAL SWELLING: 0
ABDOMINAL PAIN: 0
BACK PAIN: 0
ABDOMINAL DISTENTION: 0

## 2021-06-30 NOTE — ED PROVIDER NOTES
EMERGENCY DEPARTMENT ENCOUNTER    Pt Name: Marisela Mariano  MRN: [id number]  [place] 1970  Date of evaluation: 6/30/21  CHIEF COMPLAINT       Chief Complaint   Patient presents with    Drug Overdose     found in day care parking lot in car, 4 mg narcan IN, 6 mg narcan IV     HISTORY OF PRESENT ILLNESS   HPI   Patient is a 51-year-old male presented to the emergency department via EMS accompanied by [facility] secondary to likely drug overdose. Patient was found in a  parking lot unresponsive was given Narcan 4 mg intranasally as well as 6 mg IV with some improvement in mental status and patient was transported to the emergency department for further evaluation treatment. History as per EMS patient has a known history of drug abuse. On arrival in the emergency department patient is somnolent but arousable. No further history available on arrival in the emergency department. REVIEW OF SYSTEMS     Review of Systems   Constitutional: Negative for chills, diaphoresis and fever. HENT: Negative for congestion, ear pain and facial swelling. Eyes: Negative for pain, discharge and visual disturbance. Respiratory: Negative for chest tightness and shortness of breath. Cardiovascular: Negative for chest pain and palpitations. Gastrointestinal: Negative for abdominal distention and abdominal pain. Genitourinary: Negative for difficulty urinating and flank pain. Musculoskeletal: Negative for back pain. Skin: Negative for wound. Neurological: Negative for dizziness, light-headedness and headaches. PASTMEDICAL HISTORY     Past Medical History:   Diagnosis Date    Anxiety      SURGICAL HISTORY     History reviewed. No pertinent surgical history. CURRENT MEDICATIONS       Previous Medications    No medications on file     ALLERGIES     has No Known Allergies. FAMILY HISTORY     He indicated that the status of his mother is unknown. He indicated that the status of his father is unknown. CONSULTS:  None    FINAL IMPRESSION      1. Narcotic overdose, accidental or unintentional, initial encounter ([facility] [address]          DISPOSITION/PLAN   DISPOSITION        PATIENT REFERRED TO:  No follow-up provider specified. DISCHARGE MEDICATIONS:  New Prescriptions    No medications on file     Katrina Schultz MD  Attending Emergency Physician    This note was created with the assistance of a speech-recognition program. While intending to generate a document that actually reflects the content of the visit, no guarantees can be provided that every mistake has been identified and corrected by editing.                     Katrina Schultz MD  46/65/[phone]

## 2021-07-01 VITALS
SYSTOLIC BLOOD PRESSURE: 125 MMHG | BODY MASS INDEX: 24.87 KG/M2 | HEIGHT: 75 IN | TEMPERATURE: 97.7 F | HEART RATE: 58 BPM | OXYGEN SATURATION: 99 % | WEIGHT: 200 LBS | DIASTOLIC BLOOD PRESSURE: 78 MMHG | RESPIRATION RATE: 16 BRPM

## 2021-07-01 LAB
EKG ATRIAL RATE: 75 BPM
EKG P AXIS: 54 DEGREES
EKG P-R INTERVAL: 162 MS
EKG Q-T INTERVAL: 376 MS
EKG QRS DURATION: 86 MS
EKG QTC CALCULATION (BAZETT): 419 MS
EKG R AXIS: 58 DEGREES
EKG T AXIS: 50 DEGREES
EKG VENTRICULAR RATE: 75 BPM

## 2021-07-01 NOTE — ED PROVIDER NOTES
EMERGENCY DEPARTMENT ENCOUNTER    Pt Name: Ciara Lafleur  MRN: [id number]  [place] 1970  Date of evaluation: 7/1/21  CHIEF COMPLAINT       Chief Complaint   Patient presents with    Drug Overdose     found in day care parking lot in car, 4 mg narcan IN, 6 mg narcan IV     PASTMEDICAL HISTORY     Past Medical History:   Diagnosis Date    Anxiety      Past Problem List  Patient Active Problem List   Diagnosis Code    Drug overdose, accidental or unintentional, initial encounter T50.901A    Anxiety F41.9    Polysubstance (excluding opioids) dependence, daily use ([address] F19.20     SURGICAL HISTORY     History reviewed. No pertinent surgical history. CURRENT MEDICATIONS       There are no discharge medications for this patient. ALLERGIES     has No Known Allergies. FAMILY HISTORY     He indicated that the status of his mother is unknown. He indicated that the status of his father is unknown. SOCIAL HISTORY       Social History     Tobacco Use    Smoking status: Current Every Day Smoker     Types: Cigarettes    Smokeless tobacco: Never Used   Substance Use Topics    Alcohol use: Not Currently    Drug use: Yes     Comment: admits to \"snorting heroin\"     PHYSICAL EXAM     INITIAL VITALS: /78   Pulse 58   Temp 97.7 °F (36.5 °C) (Oral)   Resp 16   Ht 6' 3\" (1.905 m)   Wt 200 lb (90.7 kg)   SpO2 99%   BMI 25.00 kg/m²       MEDICAL DECISION MAKING:     Case was signed out to me in by Dr. Livier Gonzalez at shift change. 49-year-old male presenting to the emergency room after being found unconscious in a parking lot. History is highly suggestive of accidental overdose. Patient observed here in the emergency department for several hours and was up ambulatory moving around without difficulties and speaking appropriately. Patient discharged here from the ED.     CRITICAL CARE:       PROCEDURES:    Procedures    DIAGNOSTIC RESULTS   EKG:All EKG's are interpreted by the Emergency Department Physician who either signs or Co-signs this chart in the absence of a cardiologist.        RADIOLOGY:All plain film, CT, MRI, and formal ultrasound images (except ED bedside ultrasound) are read by the radiologist, see reports below, unless otherwisenoted in MDM or here. CT Head WO Contrast   Final Result   No acute intracranial abnormality. XR CHEST PORTABLE   Final Result   No acute process. LABS: All lab results were reviewed by myself, and all abnormals are listed below. Labs Reviewed   BASIC METABOLIC PANEL W/ REFLEX TO MG FOR LOW K - Abnormal; Notable for the following components:       Result Value    Glucose 114 (*)     All other components within normal limits   URINE DRUG SCREEN - Abnormal; Notable for the following components:    Opiates, Urine POSITIVE (*)     All other components within normal limits   ACETAMINOPHEN LEVEL - Abnormal; Notable for the following components:    Acetaminophen Level <10 (*)     All other components within normal limits   SALICYLATE LEVEL - Abnormal; Notable for the following components:    Salicylate Lvl <1 (*)     All other components within normal limits   POCT GLUCOSE - Normal   CBC WITH AUTO DIFFERENTIAL   TROPONIN   ETHANOL   POC GLUCOSE FINGERSTICK       EMERGENCY DEPARTMENTCOURSE:         Vitals:    Vitals:    06/30/21 2330 07/01/21 0000 07/01/21 0030 07/01/21 0115   BP: 116/72 119/77 124/79 125/78   Pulse: 69 63 62 58   Resp: 17 15 15 16   Temp:       TempSrc:       SpO2: 98% 98% 99% 99%   Weight:       Height:           The patient was given the following medications while in the emergency department:  Orders Placed This Encounter   Medications    ondansetron (ZOFRAN) injection 4 mg    naloxone (NARCAN) injection 2 mg    naloxone (NARCAN) injection 2 mg    0.9 % sodium chloride bolus    naloxone (NARCAN) injection 2 mg     CONSULTS:  None    FINAL IMPRESSION      1.  Narcotic overdose, accidental or unintentional, initial encounter ([facility] [address] DISPOSITION/PLAN   DISPOSITION Decision To Discharge 07/01/2021 01:42:39 AM      PATIENT REFERRED TO:  Emma Rosenbaum MD  [facility] [address]  [address]    Schedule an appointment as soon as possible for a visit in 1 week      DISCHARGE MEDICATIONS:  There are no discharge medications for this patient.     Katharina Lopez MD  Attending Emergency Physician                  Ashanti Sutton MD  07/01/21 8416

## 2021-07-01 NOTE — ED NOTES
Patient awake in room and acting appropriately. States that he has things to do for work and would like to go home. Patient was asked how he would get home and patient believes that he drove his car here. RN updated patient on his arrival here by EMS. RN updated Dr Leora Izquierdo on patients request to leave.       Rush Ayala RN  07/01/21 1389

## 2021-08-12 ENCOUNTER — HOSPITAL ENCOUNTER (EMERGENCY)
Age: 51
Discharge: HOME | End: 2021-08-13
Attending: EMERGENCY MEDICINE
Payer: COMMERCIAL

## 2021-08-12 DIAGNOSIS — T40.1X1A ACCIDENTAL OVERDOSE OF HEROIN, INITIAL ENCOUNTER (HCC): Primary | ICD-10-CM

## 2021-08-12 LAB
ALLEN TEST: NORMAL
ANION GAP: 8 MMOL/L (ref 7–16)
CHP ED QC CHECK: YES
FIO2: NORMAL
GFR NON-AFRICAN AMERICAN: >60 ML/MIN
GFR SERPL CREATININE-BSD FRML MDRD: >60 ML/MIN
GFR SERPL CREATININE-BSD FRML MDRD: NORMAL ML/MIN/{1.73_M2}
GLUCOSE BLD-MCNC: 95 MG/DL
GLUCOSE BLD-MCNC: 95 MG/DL (ref 74–100)
HCO3 VENOUS: 28.1 MMOL/L (ref 22–29)
MODE: NORMAL
NEGATIVE BASE EXCESS, VEN: NORMAL (ref 0–2)
O2 DEVICE/FLOW/%: NORMAL
O2 SAT, VEN: 73 % (ref 60–85)
PATIENT TEMP: NORMAL
PCO2, VEN: 49.2 MM HG (ref 41–51)
PH VENOUS: 7.36 (ref 7.32–7.43)
PO2, VEN: 40.5 MM HG (ref 30–50)
POC BUN: 10 MG/DL (ref 8–26)
POC CHLORIDE: 104 MMOL/L (ref 98–107)
POC CREATININE: 0.96 MG/DL (ref 0.51–1.19)
POC HEMATOCRIT: 39 % (ref 41–53)
POC HEMOGLOBIN: 13.2 G/DL (ref 13.5–17.5)
POC IONIZED CALCIUM: 1.24 MMOL/L (ref 1.15–1.33)
POC LACTIC ACID: 0.42 MMOL/L (ref 0.56–1.39)
POC PCO2 TEMP: NORMAL MM HG
POC PH TEMP: NORMAL
POC PO2 TEMP: NORMAL MM HG
POC POTASSIUM: 3.8 MMOL/L (ref 3.5–4.5)
POC SODIUM: 139 MMOL/L (ref 138–146)
POC TCO2: 29 MMOL/L (ref 22–30)
POSITIVE BASE EXCESS, VEN: 2 (ref 0–3)
SAMPLE SITE: NORMAL
TOTAL CO2, VENOUS: NORMAL MMOL/L (ref 23–30)

## 2021-08-12 PROCEDURE — 99284 EMERGENCY DEPT VISIT MOD MDM: CPT

## 2021-08-12 PROCEDURE — 93005 ELECTROCARDIOGRAM TRACING: CPT | Performed by: STUDENT IN AN ORGANIZED HEALTH CARE EDUCATION/TRAINING PROGRAM

## 2021-08-12 PROCEDURE — G0480 DRUG TEST DEF 1-7 CLASSES: HCPCS

## 2021-08-12 PROCEDURE — 84520 ASSAY OF UREA NITROGEN: CPT

## 2021-08-12 PROCEDURE — 82803 BLOOD GASES ANY COMBINATION: CPT

## 2021-08-12 PROCEDURE — 82330 ASSAY OF CALCIUM: CPT

## 2021-08-12 PROCEDURE — 83605 ASSAY OF LACTIC ACID: CPT

## 2021-08-12 PROCEDURE — 85014 HEMATOCRIT: CPT

## 2021-08-12 PROCEDURE — 82565 ASSAY OF CREATININE: CPT

## 2021-08-12 PROCEDURE — 82947 ASSAY GLUCOSE BLOOD QUANT: CPT

## 2021-08-12 PROCEDURE — 80051 ELECTROLYTE PANEL: CPT

## 2021-08-13 VITALS
TEMPERATURE: 97.9 F | HEART RATE: 83 BPM | SYSTOLIC BLOOD PRESSURE: 102 MMHG | RESPIRATION RATE: 17 BRPM | HEIGHT: 77 IN | OXYGEN SATURATION: 94 % | BODY MASS INDEX: 21.84 KG/M2 | WEIGHT: 185 LBS | DIASTOLIC BLOOD PRESSURE: 71 MMHG

## 2021-08-13 LAB
EKG ATRIAL RATE: 78 BPM
EKG P AXIS: 52 DEGREES
EKG P-R INTERVAL: 152 MS
EKG Q-T INTERVAL: 374 MS
EKG QRS DURATION: 94 MS
EKG QTC CALCULATION (BAZETT): 426 MS
EKG R AXIS: 43 DEGREES
EKG T AXIS: 57 DEGREES
EKG VENTRICULAR RATE: 78 BPM
ETHANOL PERCENT: <0.01 %
ETHANOL: <10 MG/DL

## 2021-08-13 PROCEDURE — 93010 ELECTROCARDIOGRAM REPORT: CPT | Performed by: INTERNAL MEDICINE

## 2021-08-13 ASSESSMENT — ENCOUNTER SYMPTOMS
VOMITING: 0
ABDOMINAL PAIN: 0
SHORTNESS OF BREATH: 0
NAUSEA: 0
BACK PAIN: 0

## 2021-08-13 NOTE — ED TRIAGE NOTES
Patient arrived to unit via LS 3, patient bg 384, found down in street. Patient reports he does not remember that but admits to using heroin this evening. Patient reports no medical history and is able to give a complete history. Placed on cardiac monitor with pulse ox.

## 2021-08-13 NOTE — ED NOTES
Pt ambulated to restroom NAD noted call light returned to room per self.       Fernando Gayle RN  08/13/21 9301

## 2021-08-13 NOTE — ED PROVIDER NOTES
[facility]  ED  Emergency Department Encounter  Emergency Medicine Resident     Pt Name: Severiano Cable  MRN: [id number]  [place] 1970  Date of evaluation: 8/12/21  PCP:  Bobby Barney MD    [address] [place] [address]       Chief Complaint   Patient presents with    Altered Mental Status     patient found down on the street    Fatigue    Drug Overdose     heroin       HISTORY OFPRESENT ILLNESS  (Location/Symptom, Timing/Onset, Context/Setting, Quality, Duration, Modifying Factors,Severity.)      Severiano Cable is a 48 y. o.yo male who presents with found unresponsive, down. Patient brought in by EMS was found unresponsive passed out on the street, according to EMS did not tell them that he had taken any drugs, states that he has no history of diabetes was found to have a point-of-care glucose of over 300 by EMS, was diaphoretic but alert and oriented x4 per EMS. Patient able to ambulate and talking on arrival, GCS 14 for mild confusion. Patient states that he has no history of diabetes and has not taken any medications, states that he did do heroin today and possible alcohol. Denies abdominal pain, nausea, headache or vision changes or focal deficits. Denies chest pain or shortness of breath. Did not get Narcan. PAST MEDICAL / SURGICAL / SOCIAL / FAMILY HISTORY      has a past medical history of Anxiety. has no past surgical history on file.      Social History     Socioeconomic History    Marital status:      Spouse name: Not on file    Number of children: Not on file    Years of education: Not on file    Highest education level: Not on file   Occupational History    Not on file   Tobacco Use    Smoking status: Current Every Day Smoker     Types: Cigarettes    Smokeless tobacco: Never Used   Substance and Sexual Activity    Alcohol use: Not Currently    Drug use: Yes     Comment: heroin    Sexual activity: Not on file   Other Topics Concern    Not on file Social History Narrative    Not on file     Social Determinants of Health     Financial Resource Strain:     Difficulty of Paying Living Expenses:    Food Insecurity:     Worried About Running Out of Food in the Last Year:     [address] in the Last Year:    Transportation Needs:     Lack of Transportation (Medical):  Lack of Transportation (Non-Medical):    Physical Activity:     Days of Exercise per Week:     Minutes of Exercise per Session:    Stress:     Feeling of Stress :    Social Connections:     Frequency of Communication with Friends and Family:     Frequency of Social Gatherings with Friends and Family:     Attends [employer] Services:     Active Member of Clubs or Organizations:     Attends Club or Organization Meetings:     Marital Status:    Intimate Partner Violence:     Fear of Current or Ex-Partner:     Emotionally Abused:     Physically Abused:     Sexually Abused:        Family History   Problem Relation Age of Onset    No Known Problems Mother     No Known Problems Father         Allergies:  Patient has no known allergies. Home Medications:  Prior to Admission medications    Not on File       REVIEW OFSYSTEMS    (2-9 systems for level 4, 10 or more for level 5)      Review of Systems   Constitutional: Negative for diaphoresis and fever. HENT: Negative for congestion. Eyes: Negative for visual disturbance. Respiratory: Negative for shortness of breath. Cardiovascular: Negative for chest pain. Gastrointestinal: Negative for abdominal pain, nausea and vomiting. Endocrine: Negative for polyuria. Genitourinary: Negative for dysuria. Musculoskeletal: Negative for back pain. Skin: Negative for wound. Neurological: Negative for headaches. Psychiatric/Behavioral: Positive for confusion.        PHYSICAL EXAM   (up to 7 for level 4, 8 or more forlevel 5)      ED TRIAGE VITALS BP: 122/77, Temp: 97.9 °F (36.6 °C), Pulse: 84, Resp: 14, SpO2: 94 %    Vitals:    08/12/21 2339 08/12/21 2347 08/13/21 0002 08/13/21 0017   BP:  113/77 105/72 102/71   Pulse:  76 80 83   Resp:  13 15 17   Temp: 97.9 °F (36.6 °C)      TempSrc: Oral      SpO2: 94% 95% 94% 94%   Weight:       Height:           Physical Exam  Constitutional:       Comments: Slight intoxicated   HENT:      Head: Normocephalic. Eyes:      Extraocular Movements: Extraocular movements intact. Comments: Bilateral pinpoint pupils   Cardiovascular:      Rate and Rhythm: Normal rate. Pulmonary:      Effort: Pulmonary effort is normal.   Abdominal:      Palpations: Abdomen is soft. Musculoskeletal:         General: No swelling. Normal range of motion. Cervical back: Normal range of motion. Skin:     General: Skin is warm. Neurological:      Mental Status: He is alert. GCS: GCS eye subscore is 4. GCS verbal subscore is 4. GCS motor subscore is 6. Motor: No weakness. DIFFERENTIAL  DIAGNOSIS     PLAN (LABS / IMAGING / EKG):  Orders Placed This Encounter   Procedures    ETHANOL    ELECTROLYTES PLUS    Hemoglobin and hematocrit, blood    CALCIUM, IONIC (POC)    POCT Glucose    Venous Blood Gas, POC    Creatinine W/GFR Point of Care    POCT urea (BUN)    Lactic Acid, POC    POCT Glucose    EKG 12 Lead       MEDICATIONS ORDERED:  No orders of the defined types were placed in this encounter. DDX:     Opiate overdose    Initial MDM/Plan: 48 y.o. male who presents with found unresponsive, alert and oriented on presentation. Opiate overdose:  Admits to using heroin  States he might have been drinking  Negative for alcohol  Observation for 3 hours  Tolerating p.o.   Able to ambulate  Alert and oriented on reevaluation  Safe for discharge    DIAGNOSTIC RESULTS / EMERGENCYDEPARTMENT COURSE / MDM     LABS:  Results for orders placed or performed during the hospital encounter of 08/12/21   ETHANOL   Result Value Ref Range    Ethanol <10 <10 mg/dL    Ethanol percent <0.010 <0.010 %   ELECTROLYTES PLUS   Result Value Ref Range    POC Sodium 139 138 - 146 mmol/L    POC Potassium 3.8 3.5 - 4.5 mmol/L    POC Chloride 104 98 - 107 mmol/L    POC TCO2 29 22 - 30 mmol/L    Anion Gap 8 7 - 16 mmol/L   Hemoglobin and hematocrit, blood   Result Value Ref Range    POC Hemoglobin 13.2 (L) 13.5 - 17.5 g/dL    POC Hematocrit 39 (L) 41 - 53 %   CALCIUM, IONIC (POC)   Result Value Ref Range    POC Ionized Calcium 1.24 1.15 - 1.33 mmol/L   POCT Glucose   Result Value Ref Range    Glucose 95 mg/dL    QC OK? yes    Venous Blood Gas, POC   Result Value Ref Range    pH, Erik 7.364 7.320 - 7.430    pCO2, Erik 49.2 41.0 - 51.0 mm Hg    pO2, Erik 40.5 30 - 50 mm Hg    HCO3, Venous 28.1 22.0 - 29.0 mmol/L    Total CO2, Venous NOT REPORTED 23.0 - 30.0 mmol/L    Negative Base Excess, Erik NOT REPORTED 0.0 - 2.0    Positive Base Excess, Erik 2 0.0 - 3.0    O2 Sat, Erik 73 60.0 - 85.0 %    O2 Device/Flow/% NOT REPORTED     Tai Test NOT REPORTED     Sample Site NOT REPORTED     Mode NOT REPORTED     FIO2 NOT REPORTED     Pt Temp NOT REPORTED     POC pH Temp NOT REPORTED     POC pCO2 Temp NOT REPORTED mm Hg    POC pO2 Temp NOT REPORTED mm Hg   Creatinine W/GFR Point of Care   Result Value Ref Range    POC Creatinine 0.96 0.51 - 1.19 mg/dL    GFR Comment >60 >60 mL/min    GFR Non-African American >60 >60 mL/min    GFR Comment         POCT urea (BUN)   Result Value Ref Range    POC BUN 10 8 - 26 mg/dL   Lactic Acid, POC   Result Value Ref Range    POC Lactic Acid 0.42 (L) 0.56 - 1.39 mmol/L   POCT Glucose   Result Value Ref Range    POC Glucose 95 74 - 100 mg/dL       RADIOLOGY:  No orders to display       EKG  No ST segment elevations or depressions seen on EKG, T waves inverted in lead V2 which have been seen in prior EKGs.     All EKG's are interpreted by the Emergency Department Physicianwho either signs or Co-signs this chart in the absence of a cardiologist.    EMERGENCY DEPARTMENT COURSE:  ED Course as of Aug 13 5476   Thu Aug 12, 2021   2334 95   POCT Glucose [PS]   2350 Patient seen and assessed in the emergency department no acute respiratory cardiovascular distress. Patient brought in by EMS was found unresponsive passed out on the street, according to EMS did not tell them that he had taken any drugs, states that he has no history of diabetes was found to have a point-of-care glucose of over 300 by EMS, was diaphoretic but alert and oriented x4 per EMS. Patient able to ambulate and talking on arrival, GCS 14 for mild confusion. Patient states that he has no history of diabetes and has not taken any medications, states that he did do heroin today and possible alcohol. Denies abdominal pain, nausea, headache or vision changes or focal deficits. Denies chest pain or shortness of breath. Did not get Narcan.    [PS]   Fri Aug 13, 2021   0019 Ethanol: <10 [PS]   0021 Observ for 2 hours      [PS]   0125 On reevaluation patient easily arousable, states that he can get his mother to pick him up. [PS]   0309 Ambulating well       [PS]   0309 Tolerating PO      [PS]      ED Course User Index  [PS] Anam May MD          PROCEDURES:  None    CONSULTS:  None    CRITICAL CARE:  Please see attending note    FINAL IMPRESSION      1.  Accidental overdose of heroin, initial encounter [facility])          DISPOSITION / PLAN     DISPOSITION Decision To Discharge 08/13/2021 03:32:36 AM       PATIENT REFERRED TO:  Trinidad Mendoza MD  [facility] [address]  [address]    In 3 days      [facility]  [address]  [address]  [phone]    As needed, If symptoms worsen      DISCHARGE MEDICATIONS:  New Prescriptions    No medications on file       Anam May MD  Emergency Medicine Resident    (Please note that portions of this note were completed with a voice recognition program.Efforts were made to edit the dictations but occasionally words are mis-transcribed.) Meeta Perla MD  Resident  08/13/21 5306

## 2021-08-13 NOTE — ED NOTES
Bed: 15  Expected date:   Expected time:   Means of arrival:   Comments:  Mitchell Brooks RN  08/12/21 8601

## 2021-08-13 NOTE — ED PROVIDER NOTES
[facility]   Emergency Department  Faculty Attestation       I performed a history and physical examination of the patient and discussed management with the resident. I reviewed the residents note and agree with the documented findings including all diagnostic interpretations and plan of care. Any areas of disagreement are noted on the chart. I was personally present for the key portions of any procedures. I have documented in the chart those procedures where I was not present during the key portions. I have reviewed the emergency nurses triage note. I agree with the chief complaint, past medical history, past surgical history, allergies, medications, social and family history as documented unless otherwise noted below. Documentation of the HPI, Physical Exam and Medical Decision Making performed by scribjose is based on my personal performance of the HPI, PE and MDM. For Physician Assistant/ Nurse Practitioner cases/documentation I have personally evaluated this patient and have completed at least one if not all key elements of the E/M (history, physical exam, and MDM). Additional findings are as noted. Pertinent Comments     Primary Care Physician: Emma Rosenbaum MD      ED Triage Vitals   BP Temp Temp src Pulse Resp SpO2 Height Weight   08/12/21 2329 -- -- 08/12/21 2329 08/12/21 2329 -- 08/12/21 2324 08/12/21 2324   122/77   84 14  6' 5\" (1.956 m) 185 lb (83.9 kg)        History/Physical: This is a 48 y.o. male who presents to the Emergency Department with complaint of being unresponsive. Patient admitted to nursing staff and resident that he has been doing heroin, and use more than usual tonight. However on my exam, he states that he also been drinking and was going to the [employer] stream. For EMS patient had glucose in the 300s. Patient has no current complaints but wants to know when it is 12:05 AM.    On exam, patient is drowsy but does awaken to tactile stimuli.   Normocephalic atraumatic. Pupils are pinpoint. Heart sounds regular lungs good auscultation. Abdomen soft nontender. When patient is awoken, he answers questions, and moves all extremities equally. No signs of abrasions or trauma. MDM/Plan:   Patient admits to drug use. History of drug overdoses in the past, patient reportedly had a glucose in the 300s per EMS, however here his glucose of 95. Did also mention probably drinking, therefore will check an alcohol level. Will monitor. Currently not requiring any Narcan. EKG Interpretation    Interpreted by emergency department physician       Clinical Impression: normal sinus with age indeterminate septal infarct.  Nonspecific t wave flattening/?inversion in v2 seen in EKG on 6/19/20    Daniel Kraft MD      Critical Care: None     Daniel Kraft MD  Attending Emergency Physician         Daniel Kraft MD  08/12/21 8734